# Patient Record
Sex: MALE | Race: WHITE | NOT HISPANIC OR LATINO | Employment: OTHER | URBAN - METROPOLITAN AREA
[De-identification: names, ages, dates, MRNs, and addresses within clinical notes are randomized per-mention and may not be internally consistent; named-entity substitution may affect disease eponyms.]

---

## 2021-01-15 DIAGNOSIS — Z23 NEED FOR VACCINATION: ICD-10-CM

## 2024-01-18 ENCOUNTER — APPOINTMENT (OUTPATIENT)
Dept: RADIOLOGY | Facility: MEDICAL CENTER | Age: 74
End: 2024-01-18
Attending: STUDENT IN AN ORGANIZED HEALTH CARE EDUCATION/TRAINING PROGRAM

## 2024-01-18 ENCOUNTER — HOSPITAL ENCOUNTER (OUTPATIENT)
Facility: MEDICAL CENTER | Age: 74
End: 2024-01-18
Attending: EMERGENCY MEDICINE | Admitting: STUDENT IN AN ORGANIZED HEALTH CARE EDUCATION/TRAINING PROGRAM

## 2024-01-18 ENCOUNTER — APPOINTMENT (OUTPATIENT)
Dept: RADIOLOGY | Facility: MEDICAL CENTER | Age: 74
End: 2024-01-18
Attending: EMERGENCY MEDICINE

## 2024-01-18 VITALS
DIASTOLIC BLOOD PRESSURE: 79 MMHG | WEIGHT: 130 LBS | BODY MASS INDEX: 17.61 KG/M2 | RESPIRATION RATE: 18 BRPM | HEIGHT: 72 IN | SYSTOLIC BLOOD PRESSURE: 167 MMHG | HEART RATE: 81 BPM | OXYGEN SATURATION: 93 % | TEMPERATURE: 97.4 F

## 2024-01-18 DIAGNOSIS — I65.29 STENOSIS OF CAROTID ARTERY, UNSPECIFIED LATERALITY: ICD-10-CM

## 2024-01-18 DIAGNOSIS — J43.9 PULMONARY EMPHYSEMA, UNSPECIFIED EMPHYSEMA TYPE (HCC): ICD-10-CM

## 2024-01-18 DIAGNOSIS — R04.2 HEMOPTYSIS: Primary | ICD-10-CM

## 2024-01-18 PROBLEM — I25.10 CAD (CORONARY ARTERY DISEASE): Status: ACTIVE | Noted: 2024-01-18

## 2024-01-18 PROBLEM — K21.9 GERD (GASTROESOPHAGEAL REFLUX DISEASE): Status: ACTIVE | Noted: 2024-01-18

## 2024-01-18 PROBLEM — Z72.0 TOBACCO ABUSE: Status: ACTIVE | Noted: 2024-01-18

## 2024-01-18 PROBLEM — G89.4 CHRONIC PAIN SYNDROME: Status: ACTIVE | Noted: 2024-01-18

## 2024-01-18 PROBLEM — C67.9 BLADDER CANCER (HCC): Status: ACTIVE | Noted: 2024-01-18

## 2024-01-18 PROBLEM — I10 HTN (HYPERTENSION): Status: ACTIVE | Noted: 2024-01-18

## 2024-01-18 PROBLEM — I65.21 CAROTID STENOSIS, ASYMPTOMATIC, RIGHT: Status: ACTIVE | Noted: 2024-01-18

## 2024-01-18 LAB
ALBUMIN SERPL BCP-MCNC: 4.1 G/DL (ref 3.2–4.9)
ALBUMIN/GLOB SERPL: 1.8 G/DL
ALP SERPL-CCNC: 50 U/L (ref 30–99)
ALT SERPL-CCNC: 20 U/L (ref 2–50)
ANION GAP SERPL CALC-SCNC: 10 MMOL/L (ref 7–16)
APTT PPP: 22.5 SEC (ref 24.7–36)
AST SERPL-CCNC: 22 U/L (ref 12–45)
BASOPHILS # BLD AUTO: 0.7 % (ref 0–1.8)
BASOPHILS # BLD: 0.07 K/UL (ref 0–0.12)
BILIRUB SERPL-MCNC: 0.2 MG/DL (ref 0.1–1.5)
BUN SERPL-MCNC: 16 MG/DL (ref 8–22)
CALCIUM ALBUM COR SERPL-MCNC: 9 MG/DL (ref 8.5–10.5)
CALCIUM SERPL-MCNC: 9.1 MG/DL (ref 8.5–10.5)
CHLORIDE SERPL-SCNC: 103 MMOL/L (ref 96–112)
CO2 SERPL-SCNC: 22 MMOL/L (ref 20–33)
CREAT SERPL-MCNC: 0.74 MG/DL (ref 0.5–1.4)
EOSINOPHIL # BLD AUTO: 0.18 K/UL (ref 0–0.51)
EOSINOPHIL NFR BLD: 1.8 % (ref 0–6.9)
ERYTHROCYTE [DISTWIDTH] IN BLOOD BY AUTOMATED COUNT: 50.4 FL (ref 35.9–50)
ERYTHROCYTE [DISTWIDTH] IN BLOOD BY AUTOMATED COUNT: 50.8 FL (ref 35.9–50)
GFR SERPLBLD CREATININE-BSD FMLA CKD-EPI: 95 ML/MIN/1.73 M 2
GLOBULIN SER CALC-MCNC: 2.3 G/DL (ref 1.9–3.5)
GLUCOSE SERPL-MCNC: 106 MG/DL (ref 65–99)
HCT VFR BLD AUTO: 35.5 % (ref 42–52)
HCT VFR BLD AUTO: 37.8 % (ref 42–52)
HGB BLD-MCNC: 12.4 G/DL (ref 14–18)
HGB BLD-MCNC: 12.8 G/DL (ref 14–18)
IMM GRANULOCYTES # BLD AUTO: 0.05 K/UL (ref 0–0.11)
IMM GRANULOCYTES NFR BLD AUTO: 0.5 % (ref 0–0.9)
INR PPP: 0.93 (ref 0.87–1.13)
LYMPHOCYTES # BLD AUTO: 2.34 K/UL (ref 1–4.8)
LYMPHOCYTES NFR BLD: 23.5 % (ref 22–41)
MCH RBC QN AUTO: 33.4 PG (ref 27–33)
MCH RBC QN AUTO: 33.9 PG (ref 27–33)
MCHC RBC AUTO-ENTMCNC: 33.9 G/DL (ref 32.3–36.5)
MCHC RBC AUTO-ENTMCNC: 34.9 G/DL (ref 32.3–36.5)
MCV RBC AUTO: 97 FL (ref 81.4–97.8)
MCV RBC AUTO: 98.7 FL (ref 81.4–97.8)
MONOCYTES # BLD AUTO: 0.78 K/UL (ref 0–0.85)
MONOCYTES NFR BLD AUTO: 7.8 % (ref 0–13.4)
NEUTROPHILS # BLD AUTO: 6.53 K/UL (ref 1.82–7.42)
NEUTROPHILS NFR BLD: 65.7 % (ref 44–72)
NRBC # BLD AUTO: 0 K/UL
NRBC BLD-RTO: 0 /100 WBC (ref 0–0.2)
PLATELET # BLD AUTO: 261 K/UL (ref 164–446)
PLATELET # BLD AUTO: 281 K/UL (ref 164–446)
PMV BLD AUTO: 9.4 FL (ref 9–12.9)
PMV BLD AUTO: 9.4 FL (ref 9–12.9)
POTASSIUM SERPL-SCNC: 4.2 MMOL/L (ref 3.6–5.5)
PROT SERPL-MCNC: 6.4 G/DL (ref 6–8.2)
PROTHROMBIN TIME: 12.6 SEC (ref 12–14.6)
RBC # BLD AUTO: 3.66 M/UL (ref 4.7–6.1)
RBC # BLD AUTO: 3.83 M/UL (ref 4.7–6.1)
SODIUM SERPL-SCNC: 135 MMOL/L (ref 135–145)
WBC # BLD AUTO: 10 K/UL (ref 4.8–10.8)
WBC # BLD AUTO: 8.5 K/UL (ref 4.8–10.8)

## 2024-01-18 PROCEDURE — G0378 HOSPITAL OBSERVATION PER HR: HCPCS

## 2024-01-18 PROCEDURE — 85610 PROTHROMBIN TIME: CPT

## 2024-01-18 PROCEDURE — 71046 X-RAY EXAM CHEST 2 VIEWS: CPT

## 2024-01-18 PROCEDURE — 80053 COMPREHEN METABOLIC PANEL: CPT

## 2024-01-18 PROCEDURE — 700117 HCHG RX CONTRAST REV CODE 255: Performed by: EMERGENCY MEDICINE

## 2024-01-18 PROCEDURE — 71275 CT ANGIOGRAPHY CHEST: CPT

## 2024-01-18 PROCEDURE — 94640 AIRWAY INHALATION TREATMENT: CPT

## 2024-01-18 PROCEDURE — 36415 COLL VENOUS BLD VENIPUNCTURE: CPT

## 2024-01-18 PROCEDURE — 85025 COMPLETE CBC W/AUTO DIFF WBC: CPT

## 2024-01-18 PROCEDURE — 70487 CT MAXILLOFACIAL W/DYE: CPT

## 2024-01-18 PROCEDURE — 700102 HCHG RX REV CODE 250 W/ 637 OVERRIDE(OP): Performed by: STUDENT IN AN ORGANIZED HEALTH CARE EDUCATION/TRAINING PROGRAM

## 2024-01-18 PROCEDURE — 700105 HCHG RX REV CODE 258: Performed by: STUDENT IN AN ORGANIZED HEALTH CARE EDUCATION/TRAINING PROGRAM

## 2024-01-18 PROCEDURE — 92511 NASOPHARYNGOSCOPY: CPT

## 2024-01-18 PROCEDURE — 700101 HCHG RX REV CODE 250: Performed by: EMERGENCY MEDICINE

## 2024-01-18 PROCEDURE — A9270 NON-COVERED ITEM OR SERVICE: HCPCS | Performed by: STUDENT IN AN ORGANIZED HEALTH CARE EDUCATION/TRAINING PROGRAM

## 2024-01-18 PROCEDURE — 306637 HCHG MISC ORTHO ITEM RC 0274

## 2024-01-18 PROCEDURE — 70498 CT ANGIOGRAPHY NECK: CPT

## 2024-01-18 PROCEDURE — 85730 THROMBOPLASTIN TIME PARTIAL: CPT

## 2024-01-18 PROCEDURE — 99235 HOSP IP/OBS SAME DATE MOD 70: CPT | Mod: GC | Performed by: STUDENT IN AN ORGANIZED HEALTH CARE EDUCATION/TRAINING PROGRAM

## 2024-01-18 PROCEDURE — 85027 COMPLETE CBC AUTOMATED: CPT | Mod: XU

## 2024-01-18 PROCEDURE — 99285 EMERGENCY DEPT VISIT HI MDM: CPT

## 2024-01-18 RX ORDER — OMEPRAZOLE 40 MG/1
40 CAPSULE, DELAYED RELEASE ORAL 2 TIMES DAILY
COMMUNITY

## 2024-01-18 RX ORDER — SODIUM CHLORIDE 9 MG/ML
500 INJECTION, SOLUTION INTRAVENOUS ONCE
Status: DISCONTINUED | OUTPATIENT
Start: 2024-01-18 | End: 2024-01-18 | Stop reason: HOSPADM

## 2024-01-18 RX ORDER — ISOSORBIDE MONONITRATE 30 MG/1
30 TABLET, EXTENDED RELEASE ORAL 2 TIMES DAILY
Status: DISCONTINUED | OUTPATIENT
Start: 2024-01-18 | End: 2024-01-18 | Stop reason: HOSPADM

## 2024-01-18 RX ORDER — OXYCODONE AND ACETAMINOPHEN 5; 325 MG/1; MG/1
1 TABLET ORAL EVERY 6 HOURS PRN
COMMUNITY

## 2024-01-18 RX ORDER — VITAMIN B COMPLEX
1000 TABLET ORAL DAILY
COMMUNITY

## 2024-01-18 RX ORDER — BACLOFEN 10 MG/1
10 TABLET ORAL 2 TIMES DAILY PRN
Status: DISCONTINUED | OUTPATIENT
Start: 2024-01-18 | End: 2024-01-18 | Stop reason: HOSPADM

## 2024-01-18 RX ORDER — LIDOCAINE 50 MG/G
2 PATCH TOPICAL EVERY 24 HOURS
COMMUNITY

## 2024-01-18 RX ORDER — AMOXICILLIN 250 MG
2 CAPSULE ORAL 2 TIMES DAILY
Status: DISCONTINUED | OUTPATIENT
Start: 2024-01-18 | End: 2024-01-18 | Stop reason: HOSPADM

## 2024-01-18 RX ORDER — TAMSULOSIN HYDROCHLORIDE 0.4 MG/1
0.4 CAPSULE ORAL DAILY
COMMUNITY

## 2024-01-18 RX ORDER — CLOPIDOGREL BISULFATE 75 MG/1
75 TABLET ORAL DAILY
COMMUNITY

## 2024-01-18 RX ORDER — LIDOCAINE 4 G/G
1 PATCH TOPICAL EVERY EVENING
Status: DISCONTINUED | OUTPATIENT
Start: 2024-01-18 | End: 2024-01-18 | Stop reason: HOSPADM

## 2024-01-18 RX ORDER — IBUPROFEN 600 MG/1
600 TABLET, FILM COATED ORAL 2 TIMES DAILY PRN
Status: ON HOLD | COMMUNITY
End: 2024-01-18

## 2024-01-18 RX ORDER — METOPROLOL TARTRATE 100 MG
100 TABLET ORAL 2 TIMES DAILY
COMMUNITY

## 2024-01-18 RX ORDER — LISINOPRIL 10 MG/1
10 TABLET ORAL EVERY EVENING
Status: DISCONTINUED | OUTPATIENT
Start: 2024-01-18 | End: 2024-01-18 | Stop reason: HOSPADM

## 2024-01-18 RX ORDER — POLYETHYLENE GLYCOL 3350 17 G/17G
1 POWDER, FOR SOLUTION ORAL
Status: DISCONTINUED | OUTPATIENT
Start: 2024-01-18 | End: 2024-01-18 | Stop reason: HOSPADM

## 2024-01-18 RX ORDER — NICOTINE 21 MG/24HR
21 PATCH, TRANSDERMAL 24 HOURS TRANSDERMAL
Status: DISCONTINUED | OUTPATIENT
Start: 2024-01-18 | End: 2024-01-18 | Stop reason: HOSPADM

## 2024-01-18 RX ORDER — TRANEXAMIC ACID 100 MG/ML
500 INJECTION, SOLUTION INTRAVENOUS
Status: COMPLETED | OUTPATIENT
Start: 2024-01-18 | End: 2024-01-18

## 2024-01-18 RX ORDER — BISACODYL 10 MG
10 SUPPOSITORY, RECTAL RECTAL
Status: DISCONTINUED | OUTPATIENT
Start: 2024-01-18 | End: 2024-01-18 | Stop reason: HOSPADM

## 2024-01-18 RX ORDER — SODIUM CHLORIDE, SODIUM LACTATE, POTASSIUM CHLORIDE, CALCIUM CHLORIDE 600; 310; 30; 20 MG/100ML; MG/100ML; MG/100ML; MG/100ML
INJECTION, SOLUTION INTRAVENOUS CONTINUOUS
Status: DISCONTINUED | OUTPATIENT
Start: 2024-01-18 | End: 2024-01-18

## 2024-01-18 RX ORDER — ATORVASTATIN CALCIUM 80 MG/1
80 TABLET, FILM COATED ORAL DAILY
COMMUNITY

## 2024-01-18 RX ORDER — ISOSORBIDE MONONITRATE 30 MG/1
30 TABLET, EXTENDED RELEASE ORAL 2 TIMES DAILY
COMMUNITY

## 2024-01-18 RX ORDER — ATORVASTATIN CALCIUM 80 MG/1
80 TABLET, FILM COATED ORAL EVERY EVENING
Status: DISCONTINUED | OUTPATIENT
Start: 2024-01-18 | End: 2024-01-18 | Stop reason: HOSPADM

## 2024-01-18 RX ORDER — LIDOCAINE HYDROCHLORIDE 20 MG/ML
JELLY TOPICAL
Status: COMPLETED | OUTPATIENT
Start: 2024-01-18 | End: 2024-01-18

## 2024-01-18 RX ORDER — METOPROLOL TARTRATE 25 MG/1
100 TABLET, FILM COATED ORAL 2 TIMES DAILY
Status: DISCONTINUED | OUTPATIENT
Start: 2024-01-18 | End: 2024-01-18 | Stop reason: HOSPADM

## 2024-01-18 RX ORDER — CYCLOBENZAPRINE HCL 10 MG
10 TABLET ORAL 3 TIMES DAILY PRN
COMMUNITY

## 2024-01-18 RX ORDER — ACETAMINOPHEN 500 MG
500-1000 TABLET ORAL 3 TIMES DAILY PRN
Status: SHIPPED | COMMUNITY
End: 2024-01-18

## 2024-01-18 RX ORDER — BACLOFEN 10 MG/1
10 TABLET ORAL 2 TIMES DAILY PRN
COMMUNITY

## 2024-01-18 RX ORDER — TRETINOIN 1 MG/G
1 CREAM TOPICAL NIGHTLY
COMMUNITY

## 2024-01-18 RX ORDER — DULOXETIN HYDROCHLORIDE 60 MG/1
60 CAPSULE, DELAYED RELEASE ORAL DAILY
COMMUNITY

## 2024-01-18 RX ORDER — TAMSULOSIN HYDROCHLORIDE 0.4 MG/1
0.4 CAPSULE ORAL EVERY EVENING
Status: DISCONTINUED | OUTPATIENT
Start: 2024-01-18 | End: 2024-01-18 | Stop reason: HOSPADM

## 2024-01-18 RX ORDER — LISINOPRIL 10 MG/1
10 TABLET ORAL DAILY
COMMUNITY

## 2024-01-18 RX ORDER — TRANEXAMIC ACID 100 MG/ML
3 INJECTION, SOLUTION INTRAVENOUS ONCE
Status: DISCONTINUED | OUTPATIENT
Start: 2024-01-18 | End: 2024-01-18

## 2024-01-18 RX ADMIN — TRANEXAMIC ACID 500 MG: 100 INJECTION, SOLUTION INTRAVENOUS at 04:09

## 2024-01-18 RX ADMIN — METOPROLOL TARTRATE 100 MG: 25 TABLET, FILM COATED ORAL at 11:16

## 2024-01-18 RX ADMIN — LIDOCAINE HYDROCHLORIDE 6 ML: 20 JELLY TOPICAL at 01:36

## 2024-01-18 RX ADMIN — ISOSORBIDE MONONITRATE 30 MG: 30 TABLET, EXTENDED RELEASE ORAL at 11:16

## 2024-01-18 RX ADMIN — OMEPRAZOLE 40 MG: 20 CAPSULE, DELAYED RELEASE ORAL at 11:16

## 2024-01-18 RX ADMIN — NICOTINE TRANSDERMAL SYSTEM 21 MG: 21 PATCH, EXTENDED RELEASE TRANSDERMAL at 08:50

## 2024-01-18 RX ADMIN — IOHEXOL 80 ML: 350 INJECTION, SOLUTION INTRAVENOUS at 03:05

## 2024-01-18 RX ADMIN — SODIUM CHLORIDE 500 ML: 9 INJECTION, SOLUTION INTRAVENOUS at 11:18

## 2024-01-18 ASSESSMENT — ENCOUNTER SYMPTOMS
CHILLS: 0
COUGH: 1
FALLS: 0
SENSORY CHANGE: 0
DIZZINESS: 0
SPUTUM PRODUCTION: 0
BACK PAIN: 1
NAUSEA: 0
VOMITING: 0
HEMOPTYSIS: 1
WHEEZING: 0
BLURRED VISION: 0
SPEECH CHANGE: 0
PALPITATIONS: 0
EYE PAIN: 0
SINUS PAIN: 0
HEARTBURN: 0
FEVER: 0
WEAKNESS: 0
HEADACHES: 0
SHORTNESS OF BREATH: 0
ABDOMINAL PAIN: 0
DOUBLE VISION: 0

## 2024-01-18 ASSESSMENT — FIBROSIS 4 INDEX: FIB4 SCORE: 1.2

## 2024-01-18 NOTE — ED TRIAGE NOTES
Pt was BIBA from home  Chief Complaint   Patient presents with    Bleeding/Bruising     Pt reports he was diagnosed with a cyst in his nasal cavity a few months ago and has bloody drainage down his throat from the cyst at baseline. However pt reports increase in bloody drainage over the last 3-4 hours.      Pt currently spitting up bloody drainage from throat. Pt denies any known trauma to the area and no trauma noted. Reports hx of MI and chronic back pain. A&Ox4, GCS=15. Bleeding controlled and airway patent at this time.

## 2024-01-18 NOTE — ASSESSMENT & PLAN NOTE
Long hx of GERD and on PPI chronically  - Continue home omeprazole 40mg bid  - Possible bleeding source from esophagus

## 2024-01-18 NOTE — ASSESSMENT & PLAN NOTE
Pt states has gone through 14 bcg treatments but recent repeat biopsy showed recurrence  - [facility]

## 2024-01-18 NOTE — ED PROVIDER NOTES
"ED Provider Note    Scribed for Dr. De Oliveira by Jayson Meza. 1/18/2024,  12:31 AM.      CHIEF COMPLAINT  Chief Complaint   Patient presents with    Bleeding/Bruising     Pt reports he was diagnosed with a cyst in his nasal cavity a few months ago and has bloody drainage down his throat from the cyst at baseline. However pt reports increase in bloody drainage over the last 3-4 hours.      EXTERNAL RECORDS REVIEWED  Outpatient Notes Seen by ENT on 1/10/24 for these same complaints    HPI    LIMITATION TO HISTORY   Select: : None    Elvis Ambrose is a 73 y.o. male who presents to the Emergency Department for bleeding onset prior to arrival. He states that a few months ago he was diagnosed with a cyst in his nasal cavity. This cyst was filled with pus and he was placed on antibiotics. He did not see any improvement but the ENT was unable to provide any more care for him.  Tonight he coughed \"harder than I ever did\", he was then spitting up blood. The blood does not drip down his throat or out his nose but instead he spits it up. He has been coughing for months, but was not painful until today. He denies any chest pain. Has a history of multiple back surgeries.     REVIEW OF SYSTEMS  See HPI for further details. All other systems are negative.     PAST MEDICAL HISTORY     Past Medical History:   Diagnosis Date    Chronic back pain     Heart attack (HCC) 2003       SURGICAL HISTORY  Past Surgical History:   Procedure Laterality Date    STENT PLACEMENT  2003       FAMILY HISTORY  History reviewed. No pertinent family history.    SOCIAL HISTORY    reports that he has been smoking cigarettes. He started smoking about 50 years ago. He has a 25.0 pack-year smoking history. He has never used smokeless tobacco. He reports current alcohol use. He reports current drug use. Drug: Inhaled.    CURRENT MEDICATIONS  Home Medications       Reviewed by Gifty Hayes R.N. (Registered Nurse) on 01/18/24 at 0020  Med List Status: " "Partial     Medication Last Dose Status        Patient Luigi Taking any Medications                           ALLERGIES  No Known Allergies    PHYSICAL EXAM  VITAL SIGNS: BP (!) 154/78   Pulse 77   Temp 36.3 °C (97.4 °F) (Temporal)   Resp 18   Ht 1.829 m (6')   Wt 59 kg (130 lb)   SpO2 95%   BMI 17.63 kg/m²   Gen: Alert, no acute distress  HEENT: ATNC, dried blood in the posterior pharynx  Eyes: PERRL, EOMI, normal conjunctiva  Neck: trachea midline  Resp: no respiratory distress, ctab  CV: No JVD, regular rate and rhythm  Abd: non-distended  Ext: No deformities  Neuro: speech fluent    DIAGNOSTIC STUDIES / PROCEDURES    RADIOLOGY  I have independently interpreted the diagnostic imaging associated with this visit.  My preliminary interpretation is as follows: CTA shows no blood clots  Radiologist interpretation:    CT-CTA CHEST PULMONARY ARTERY W/ RECONS   Final Result         1.  No pulmonary embolus appreciated.   2.  Enlarged right hilar lymph node, consider causes of adenopathy with additional workup as clinically appropriate.   3.  Scattered paraseptal emphysematous changes   4.  Atherosclerosis and atherosclerotic coronary artery disease.      CT-CTA NECK WITH & W/O-POST PROCESSING   Final Result         1.  Greater than 70% stenosis of the right internal carotid artery proximally.         DX-CHEST-2 VIEWS   Final Result         1.  No acute cardiopulmonary disease.        COURSE & MEDICAL DECISION MAKING  Pertinent Labs & Imaging studies were reviewed. (See chart for details)    ED Observation Status? No, this patient does not meet criteria for ED Observation.   -----------    12:31 AM Patient seen and examined at bedside. He presents for nasal discharge, was being followed by ENT for nasal cyst. Has been on antibiotics with no improvement, ENT told him there was \"nothing more they could do\". Had a fit of coughing today and then began to spit up blood. Plan to insert camera up his nose for " visualization, and chest x-ray for cough.     1:49 AM - Lidocaine jelly was injected up the nose and then camera was inserted. No polyp or cyst seen. Would like to obtain CT imaging to attempt to localize bleeding.     INITIAL ASSESSMENT AND PLAN  Medical Decision Making: Patient presents with spitting up blood which she believes is coming from his nose.  No evidence of dried blood in the naris.  He does have some blood in the back of his oropharynx but no evidence of active flow.  Lungs are clear.    After nasopharyngeal scope, no evidence of bleeding source in the nasopharynx.  This increases my concern for possible blood clot in the lung or other source of bleeding.  CTA of the chest as well as neck demonstrate no pulmonary embolism or obvious masses.  Incidental carotid artery stenosis, emphysema changes of the lung    Patient treated with tranexamic acid with slight improvement in his bleeding.  Unfortunately patient continues to have hemoptysis that is frankly bloody.  Given unclear etiology for this, he may require bronchoscopy.  Will plan for hospitalization.    ADDITIONAL PROBLEM LIST AND DISPOSITION  Nasopharyngoscopy  Indications: Bleeding  Method: fiberoptic scope  Anesthesia: Viscous lidocaine  Location: R nare  Informed consent was obtained. After adequate anesthesia, the fiberoptic scope was intraduced into the nare.  The camera was advanced until there was good visualization of the nasopharynx and oropharynx.   Findings: normal anatomy, no masses, no active bleeding, 2 tiny areas of minimal blood streak mixed with mucous present in the nasopharynx  Patient tolerated procedure well with no immediate complications.  Blood loss: none      Barriers to care at this time, including but not limited to: No primary care provider        Case discussed with  [facility], who will evaluate the patient for hospitalization. Patient will be hospitalized in guarded condition.    FINAL IMPRESSION  1.  Hemoptysis    2. Stenosis of carotid artery, unspecified laterality    3. Pulmonary emphysema, unspecified emphysema type (HCC)         IJayson (Scribe), am scribing for, and in the presence of, Doug De Oliveira M.D..    Electronically signed by: Jayson Meza (Scribe), 1/18/2024    Doug BUENROSTRO M.D. personally performed the services described in this documentation, as scribed by Jayson Meza in my presence, and it is both accurate and complete.    The note accurately reflects work and decisions made by me.  Doug De Oliveira M.D.  1/18/2024  4:38 AM    This dictation was created using voice recognition software. The accuracy of the dictation is limited to the abilities of the software. I expect there may be some errors of grammar and possibly content. The nursing notes were reviewed and certain aspects of this information were incorporated into this note.

## 2024-01-18 NOTE — ED NOTES
Report received from Gifty VASQUEZ, assumed care of patient.  Call light and necessary belongings within reach.  Bed in lowest position.

## 2024-01-18 NOTE — H&P
[facility] History & Physical Note    Date of Service  1/18/2024    [facility] [place] Team: JOELLEN   Attending: Maria Elena Bolton M.d.  Senior Resident: Dr. Aparicio  Contact Number: [phone]    Primary Care Physician  Pcp Not In Computer    Consultants  None    Code Status  Full Code    Chief Complaint  Chief Complaint   Patient presents with    Bleeding/Bruising     Pt reports he was diagnosed with a cyst in his nasal cavity a few months ago and has bloody drainage down his throat from the cyst at baseline. However pt reports increase in bloody drainage over the last 3-4 hours.        History of Presenting Illness (HPI):  Elvis Ambrose is a 73 y.o. man with hx of bladder cancer s/p bcg with recent recurrence confirmed with biopsy, GERD, tobacco use, HTN, HLD, chronic pain, and CAD s/p stents who presented 1/18/2024 with hemoptysis. Pt states symptoms started 1d ago after having a strong forceful cough. Has had intermittent symptoms of throat closing and hemoptysis for the last 9 months, but has never been as severe as this episode. [facility] and states has had multiple scopes done which revealed a ?cyst in throat. States was treated with abx courses x2 which did improve his symptoms. Also has noticed weight loss. Denies fevers, chills, congestion, headaches, shortness of breath, or chest pain.    In the ED, vitals significant for /78. Labs significant for hgb 12.8 (baseline 14-15). CXR wnl. CT PE showed no PE but did show scattered paraseptal emphysematous changes. CTA neck showed > 70% stenosis of R ICA. Nasopharyngoscopy done by ED physician with 2 tiny areas of minimal blood streak present in nasopharynx but no other active bleeding seen. Was given txa in the ED.     I discussed the plan of care with patient.    Review of Systems  Review of Systems   Constitutional:  Negative for chills, fever and malaise/fatigue.   HENT:  Negative for ear pain, hearing loss and sinus pain.    Eyes:  Negative  for blurred vision, double vision and pain.   Respiratory:  Positive for cough and hemoptysis. Negative for sputum production, shortness of breath and wheezing.    Cardiovascular:  Negative for chest pain, palpitations and leg swelling.   Gastrointestinal:  Negative for abdominal pain, heartburn, nausea and vomiting.   Genitourinary:  Negative for dysuria, frequency and urgency.   Musculoskeletal:  Positive for back pain and joint pain. Negative for falls.   Skin:  Negative for itching and rash.   Neurological:  Negative for dizziness, sensory change, speech change, weakness and headaches.       Past Medical History   has a past medical history of Chronic back pain and Heart attack (HCC) ().    Surgical History   has a past surgical history that includes stent placement ().     Family History  family history is not on file.   Family history reviewed with patient.     Social History  Tobacco: Has smoked for 60 years but reduced recently, now around 1/2ppd  Alcohol: Previously heavy drinker, now occasional beer  Recreational drugs (illegal or prescription): Occasional marijuana use  Employment: Retired  Living Situation: Lives alone  Recent Travel: N/A  Primary Care Provider: Reviewed [facility] ([facility])  Other (stressors, spirituality, exposures): Wife  last year    Allergies  No Known Allergies    Medications  None       Physical Exam  Temp:  [36.3 °C (97.4 °F)] 36.3 °C (97.4 °F)  Pulse:  [66-82] 81  Resp:  [16-37] 18  BP: (134-166)/(63-92) 147/71  SpO2:  [91 %-95 %] 93 %  Blood Pressure : (!) 166/92   Temperature: 36.3 °C (97.4 °F)   Pulse: 82   Respiration: 18   Pulse Oximetry: 91 %       Physical Exam  Constitutional:       Appearance: Normal appearance. He is normal weight.   HENT:      Head: Normocephalic and atraumatic.      Nose: Nose normal.      Mouth/Throat:      Mouth: Mucous membranes are dry.      Pharynx: Posterior oropharyngeal erythema present.      Comments: Dark blood streaks seen in  posterior oropharynx. Poor dentition  Eyes:      General: No scleral icterus.     Extraocular Movements: Extraocular movements intact.      Conjunctiva/sclera: Conjunctivae normal.      Pupils: Pupils are equal, round, and reactive to light.   Cardiovascular:      Rate and Rhythm: Normal rate and regular rhythm.      Pulses: Normal pulses.      Heart sounds: Normal heart sounds. No murmur heard.  Pulmonary:      Effort: Pulmonary effort is normal. No respiratory distress.      Breath sounds: Normal breath sounds. No stridor. No wheezing.      Comments: Coughing throughout visit. Emesis bag with bright red blood mixed with white colored sputum.   Abdominal:      General: Abdomen is flat. Bowel sounds are normal. There is no distension.      Palpations: Abdomen is soft. There is no mass.   Musculoskeletal:         General: No swelling or tenderness. Normal range of motion.   Skin:     General: Skin is warm and dry.      Capillary Refill: Capillary refill takes less than 2 seconds.      Coloration: Skin is not jaundiced or pale.   Neurological:      General: No focal deficit present.      Mental Status: He is alert and oriented to person, place, and time. Mental status is at baseline.         Laboratory:  Recent Labs     01/18/24 0223   WBC 10.0   RBC 3.83*   HEMOGLOBIN 12.8*   HEMATOCRIT 37.8*   MCV 98.7*   MCH 33.4*   MCHC 33.9   RDW 50.8*   PLATELETCT 281   MPV 9.4     Recent Labs     01/18/24 0223   SODIUM 135   POTASSIUM 4.2   CHLORIDE 103   CO2 22   GLUCOSE 106*   BUN 16   CREATININE 0.74   CALCIUM 9.1     Recent Labs     01/18/24 0223   ALTSGPT 20   ASTSGOT 22   ALKPHOSPHAT 50   TBILIRUBIN 0.2   GLUCOSE 106*     Recent Labs     01/18/24  0335   APTT 22.5*   INR 0.93       Imaging:  CT-CTA CHEST PULMONARY ARTERY W/ RECONS   Final Result         1.  No pulmonary embolus appreciated.   2.  Enlarged right hilar lymph node, consider causes of adenopathy with additional workup as clinically appropriate.   3.   Scattered paraseptal emphysematous changes   4.  Atherosclerosis and atherosclerotic coronary artery disease.      CT-CTA NECK WITH & W/O-POST PROCESSING   Final Result         1.  Greater than 70% stenosis of the right internal carotid artery proximally.         DX-CHEST-2 VIEWS   Final Result         1.  No acute cardiopulmonary disease.          X-Ray:  I have personally reviewed the images and compared with prior images. and My impression is: No acute cardiopulmonary abnormality    Assessment/Plan:  Problem Representation:   I anticipate this patient is appropriate for observation status at this time because mild hemoptysis    Patient will need a Med/Surg bed on MEDICAL service .  The need is secondary to mild hemoptysis.    * Hemoptysis- (present on admission)  Assessment & Plan  Pt with intermittent bouts of hemoptysis with gabriel red blood, worst episode yesterday  - Differentials include pulmonary malignancy vs pharyngeal cyst  - CT PE negative  - CTA neck without soft tissue abnormalities, but did show > 70% R ICA stenosis  - Hgb decreased from baseline but stable  - Nasopharyngoscopy done by ED physician without signs of active bleeding  - Pulmonology consult in am for possible bronchoscopy  - Monitor H/H closely  - Hold home antiplatelets and DVTppx    Carotid stenosis, asymptomatic, right  Assessment & Plan  CTA neck with > 70% R ICA stenosis  - Asymptomatic at this time  - Outpatient vascular consult for possible CEA    CAD (coronary artery disease)  Assessment & Plan  Per pt, CAD with stent placement in past  - Hold Plavix for now due to active mild bleeding  - Continue Atorvastatin, metoprolol, and isosorbide    Chronic pain syndrome  Assessment & Plan  Per chart review, pt on oxycodone, baclofen, and flexeril as outpatient  - Pain regimen while inpatient  - Bowel regimen  - Monitor respiratory status and hemodynamics    Tobacco abuse  Assessment & Plan  60 years of smoking, is trying to reduce  -  Nicotine replacement therapy  - Tobacco cessation counseling when appropriate    HTN (hypertension)  Assessment & Plan  Continue home lisinopril, metoprolol, and isosorbide    GERD (gastroesophageal reflux disease)  Assessment & Plan  Long hx of GERD and on PPI chronically  - Continue home omeprazole 40mg bid  - Possible bleeding source from esophagus    Bladder cancer (HCC)  Assessment & Plan  Pt states has gone through 14 bcg treatments but recent repeat biopsy showed recurrence  - [facility]      VTE prophylaxis: SCDs/TEDs

## 2024-01-18 NOTE — ASSESSMENT & PLAN NOTE
Pt with intermittent bouts of hemoptysis with gabriel red blood, worst episode yesterday  - Differentials include pulmonary malignancy vs pharyngeal cyst  - CT PE negative  - CTA neck without soft tissue abnormalities, but did show > 70% R ICA stenosis  - Hgb decreased from baseline but stable  - Nasopharyngoscopy done by ED physician without signs of active bleeding  - Pulmonology consult in am for possible bronchoscopy  - Monitor H/H closely  - Hold home antiplatelets and DVTppx

## 2024-01-18 NOTE — DISCHARGE INSTRUCTIONS
Discharge Instructions per Augie Gray M.D.    You were hospitalized for blood in your cough (hemoptysis) for which a CT of your chest and throat were not indicative of infection or active bleeding. [facility]    DIET: Regular    ACTIVITY: Regular    DIAGNOSIS: Hemoptysis    Return to ER if you faint for any reason, develop sudden chest pain or shortness of breath, or develop fevers or chills (>100.4 F or >38 C).

## 2024-01-18 NOTE — ASSESSMENT & PLAN NOTE
Per pt, CAD with stent placement in past  - Hold Plavix for now due to active mild bleeding  - Continue Atorvastatin, metoprolol, and isosorbide

## 2024-01-18 NOTE — ASSESSMENT & PLAN NOTE
60 years of smoking, is trying to reduce  - Nicotine replacement therapy  - Tobacco cessation counseling when appropriate

## 2024-01-18 NOTE — ASSESSMENT & PLAN NOTE
Per chart review, pt on oxycodone, baclofen, and flexeril as outpatient  - Pain regimen while inpatient  - Bowel regimen  - Monitor respiratory status and hemodynamics

## 2024-01-18 NOTE — ED NOTES
Med rec complete per patient  Allergies reviewed.   Outpatient antibiotics in the last 30 days? No   Anticoagulants taken in the last 14 days? No     Pharmacy patient utilizes: [email or web address]

## 2024-01-18 NOTE — PROGRESS NOTES
Report received. Went to CT. Arrived to unit. Ambulated to restroom. No active bleeding. Plan to DC after CT results if cleared. Asking for help calling a cab if DC.

## 2024-01-18 NOTE — ASSESSMENT & PLAN NOTE
CTA neck with > 70% R ICA stenosis  - Asymptomatic at this time  - Outpatient vascular consult for possible CEA